# Patient Record
Sex: MALE | Race: WHITE | URBAN - METROPOLITAN AREA
[De-identification: names, ages, dates, MRNs, and addresses within clinical notes are randomized per-mention and may not be internally consistent; named-entity substitution may affect disease eponyms.]

---

## 2020-02-12 ENCOUNTER — TRANSFERRED RECORDS (OUTPATIENT)
Dept: HEALTH INFORMATION MANAGEMENT | Facility: CLINIC | Age: 70
End: 2020-02-12

## 2020-03-02 ENCOUNTER — TRANSFERRED RECORDS (OUTPATIENT)
Dept: HEALTH INFORMATION MANAGEMENT | Facility: CLINIC | Age: 70
End: 2020-03-02

## 2020-04-02 ENCOUNTER — TRANSFERRED RECORDS (OUTPATIENT)
Dept: HEALTH INFORMATION MANAGEMENT | Facility: CLINIC | Age: 70
End: 2020-04-02

## 2020-04-17 ENCOUNTER — TELEPHONE (OUTPATIENT)
Dept: ONCOLOGY | Facility: CLINIC | Age: 70
End: 2020-04-17

## 2020-04-17 ENCOUNTER — TRANSCRIBE ORDERS (OUTPATIENT)
Dept: OTHER | Age: 70
End: 2020-04-17

## 2020-04-17 DIAGNOSIS — C61 PROSTATE CANCER (H): Primary | ICD-10-CM

## 2020-04-17 NOTE — TELEPHONE ENCOUNTER
ONCOLOGY INTAKE: Records Information      APPT INFORMATION:  Referring provider: Susy Benjamin MD  Referring provider s clinic:  [facility]  Reason for visit/diagnosis:  Prostate Cancer  Has patient been notified of appointment date and time?: Yes    RECORDS INFORMATION:  Were the records received with the referral (via [employer])? Yes    Has patient been seen for any external appt for this diagnosis? No    If yes, where? N/a    Has patient had any imaging or procedures outside of [facility] for this condition? No      If Yes, where? N/a    ADDITIONAL INFORMATION:  None

## 2020-04-17 NOTE — TELEPHONE ENCOUNTER
RECORDS STATUS - ALL OTHER DIAGNOSIS      RECORDS RECEIVED FROM: [facility]   DATE RECEIVED: 4/21/2020    NOTES STATUS DETAILS   OFFICE NOTE from referring provider Complete  Refer by Dr. Susy Benjamin   OFFICE NOTE from medical oncologist Requesting  [facility]    DISCHARGE SUMMARY from hospital N/A    DISCHARGE REPORT from the ER     OPERATIVE REPORT     MEDICATION LIST Complete  [facility]   CLINICAL TRIAL TREATMENTS TO DATE     LABS     PATHOLOGY REPORTS Requested- [facility]   Tracking Number:    [id number]  Second fax request sent on 4/20/2020  Prostate Biopsy from 4/9/2020   Case: [id number]   ANYTHING RELATED TO DIAGNOSIs     GENONOMIC TESTING     TYPE:     IMAGING (NEED IMAGES & REPORT)     CT SCANS Complete- [facility]/ [facility] CT Abdomen 4/2/2020   Xray Bone Density Scan - whole body  Request- [facility]/ [facility]    Complete- [facility] 7/10/2012    4/2/2020   MRI     MAMMO     ULTRASOUND     PET       Action    Action Taken 4/17/2020 3:05pm     I called pt Gee - I left a vm requesting a call back.     He said all his imaging, pathology and records are at [facility]. I sent an SWATI to his email address: [email or web address]    I called [facility]   Ph: [phone]  Fax: [phone]  I spoke to a woman named Claire   [facility] closes at 4pm.     3:30pm   I called pt Juan dye and he signed the SWATI right away    3:47pm   I faxed the release and request form over to Claire at [facility]     Some IMG was done at [facility] (Push to PACS) [phone] #3- Karla is going to push at least three images to  PACS.     4/20/2020 8:24am   I faxed MR ([facility]) to [facility].     I called [facility] ph: [phone] - they recommended calling [facility] ph: [phone]- they will push the bone density scan from 2012.     8:34am   I faxed the MR ([facility]) to Dr. Plascencia too.     I called [facility] again     I called Karla at [facility] and she has no additional IMGs to request.    I called  jessica Martinez back- I told him that his records are in from [facility] and he confirmed that there aren't many images to request.      I sent another individual request for pathology slides to [facility].     4/22/2020 9:19am   Dr. Plascencia asked about the [facility] IMGs that were resolved in PACS last Friday. I also asked for a fax number to send the reports to.     9:38am   I called jessica Martinez-he confirmed again that there are no chest images to request.

## 2020-04-20 ENCOUNTER — TRANSFERRED RECORDS (OUTPATIENT)
Dept: HEALTH INFORMATION MANAGEMENT | Facility: CLINIC | Age: 70
End: 2020-04-20

## 2020-04-21 ENCOUNTER — PRE VISIT (OUTPATIENT)
Dept: ONCOLOGY | Facility: CLINIC | Age: 70
End: 2020-04-21

## 2020-04-21 ENCOUNTER — VIRTUAL VISIT (OUTPATIENT)
Dept: ONCOLOGY | Facility: CLINIC | Age: 70
End: 2020-04-21
Payer: COMMERCIAL

## 2020-04-21 VITALS — BODY MASS INDEX: 34.46 KG/M2 | WEIGHT: 260 LBS | HEIGHT: 73 IN

## 2020-04-21 DIAGNOSIS — C61 PROSTATE CANCER (H): Primary | ICD-10-CM

## 2020-04-21 PROCEDURE — 99203 OFFICE O/P NEW LOW 30 MIN: CPT | Mod: 95 | Performed by: INTERNAL MEDICINE

## 2020-04-21 RX ORDER — FLUTICASONE PROPIONATE 50 MCG
2 SPRAY, SUSPENSION (ML) NASAL PRN
COMMUNITY

## 2020-04-21 RX ORDER — METOPROLOL SUCCINATE 50 MG/1
50 TABLET, EXTENDED RELEASE ORAL DAILY
COMMUNITY
Start: 2020-03-30

## 2020-04-21 ASSESSMENT — PAIN SCALES - GENERAL: PAINLEVEL: MODERATE PAIN (5)

## 2020-04-21 ASSESSMENT — MIFFLIN-ST. JEOR: SCORE: 2008.23

## 2020-04-21 NOTE — NURSING NOTE
SYMPTOM ASSESSMENT    Pain: 5/10- knees (states he should have gotten knees replacements last Fall but didn't)    Nausea/Vomiting: no    Mouth Sores: no    Shortness of Breath: no    Smoking: no    Fever or Chills: no    Hard Stools: no    Soft Stools: no    Weight Loss: no    Weakness: no    Burning, numbness or tingling in hands or feet: yes    Problems with skin or swelling: no    Memory Loss: no    Anxiety or Depression: anxiety (COVID related)    Trouble Sleeping: no    Socorro Marquez LPN on 4/21/2020 at 1:24 PM

## 2020-04-21 NOTE — PATIENT INSTRUCTIONS
Please let us know once you have made any decision and we will then refer you to the appropriate specialty accordingly.

## 2020-04-21 NOTE — PROGRESS NOTES
"Gee Zarco is a 69 year old male who is being evaluated via a billable video visit.      The patient has been notified of following:     \"This video visit will be conducted via a call between you and your physician/provider. We have found that certain health care needs can be provided without the need for an in-person physical exam.  This service lets us provide the care you need with a video conversation.  If a prescription is necessary we can send it directly to your pharmacy.  If lab work is needed we can place an order for that and you can then stop by our lab to have the test done at a later time.    Video visits are billed at different rates depending on your insurance coverage.  Please reach out to your insurance provider with any questions.    If during the course of the call the physician/provider feels a video visit is not appropriate, you will not be charged for this service.\"    Patient has given verbal consent for Video visit? Yes    How would you like to obtain your AVS? MyChart    Patient would like the video invitation sent by: Send to e-mail at: [email or web address]    Will anyone else be joining your video visit? No    Video Start Time: Unable to connect to video so changed to phone visit. 1:49 PM      Video-Visit Details    Type of service:  Video Visit- changed to phone visit.    Video End Time (time video stopped): 2:17 PM    Originating Location (pt. Location): Home    Distant Location (provider location):  [facility]     Mode of Communication:  Video Conference via [employer]- changed to phone visit.      Janel Plascencia MD    Oncology initial visit:  Date on this visit: 4/21/2020    Gee Zarco  is referred by Dr.Mary ZO Benjamin for an oncology consultation. He requires evaluation for new diagnosis of prostate cancer.    Primary Physician: Susy Benjamin       Nursing Notes:   Jimmy April, LPN  4/21/2020  1:24 PM  Signed  SYMPTOM ASSESSMENT    Pain: 5/10- " knees (states he should have gotten knees replacements last Fall but didn't)    Nausea/Vomiting: no    Mouth Sores: no    Shortness of Breath: no    Smoking: no    Fever or Chills: no    Hard Stools: no    Soft Stools: no    Weight Loss: no    Weakness: no    Burning, numbness or tingling in hands or feet: yes    Problems with skin or swelling: no    Memory Loss: no    Anxiety or Depression: anxiety (COVID related)    Trouble Sleeping: no    Socorro Marquez LPN on 4/21/2020 at 1:24 PM    History Of Present Illness:  This is a phone visit.  Mr. Zarco is a 69 year old male who presents with new diagnosis of prostate cancer.    He had an elevated PSA level of 9.57 in Jan 2020 on a routine annual physical visit.  Denies any LUTS. No pain. Remains fully active and is fully active. Has arthritis in both knees which limits some of his activities. He tells me that he was offered knee replacement but he was not willing to get it done then.  He has mild tingling and burning in the toes for several years- no recent change. No recent issues with GI tract.    ECOG 0    ROS:  A comprehensive ROS was otherwise neg      Past Medical/Surgical History:  Past Medical History:   Diagnosis Date     AK (actinic keratosis) 7/24/2012     Allergies      Cataracts, both eyes 8/28/2012     Photosensitive contact dermatitis    Bilateral knee arthritis.  Cluster headaches.  Ulcerative colitis- diagnosed 10 years ago when he had diarrhea issues and colonoscopy was done- was on Mercaptopurine for 10 years- but he recently had a colonoscopy in Feb 2020, which showed no evidence of UC so Mercaptopurine was stopped.     No past surgical history on file.     Cancer History:   As above    Allergies:  Allergies as of 04/21/2020 - Reviewed 04/21/2020   Allergen Reaction Noted     Penicillin g  07/23/2012     Current Medications:  Current Outpatient Medications   Medication Sig Dispense Refill     atorvastatin (LIPITOR) 40 MG tablet Take 40 mg by mouth  daily.       fluticasone (FLONASE) 50 MCG/ACT nasal spray Spray 2 sprays in nostril as needed       levothyroxine (SYNTHROID, LEVOTHROID) 112 MCG tablet Take 224 tablets by mouth daily.       metoprolol succinate ER (TOPROL-XL) 50 MG 24 hr tablet Take 50 mg by mouth daily       mercaptopurine (PURINETHOL) 50 MG tablet Take 100 mg by mouth daily.        Family History:  Family History   Problem Relation Age of Onset     Cancer No family hx of      Diabetes No family hx of      Hypertension No family hx of      Cerebrovascular Disease No family hx of      Thyroid Disease No family hx of      Glaucoma No family hx of      Macular Degeneration No family hx of    Dad had lung cancer at 70.  Esophageal cancer in her paternal grand mother at 90.  He has 1 daughter who is healthy.    Social History:  Social History     Socioeconomic History     Marital status:      Spouse name: Not on file     Number of children: Not on file     Years of education: Not on file     Highest education level: Not on file   Occupational History     Not on file   Social Needs     Financial resource strain: Not on file     Food insecurity     Worry: Not on file     Inability: Not on file     Transportation needs     Medical: Not on file     Non-medical: Not on file   Tobacco Use     Smoking status: Former Smoker     Types: Cigarettes     Smokeless tobacco: Never Used     Tobacco comment: lives in smoke free household.   Substance and Sexual Activity     Alcohol use: Not on file     Drug use: Not on file     Sexual activity: Not on file   Lifestyle     Physical activity     Days per week: Not on file     Minutes per session: Not on file     Stress: Not on file   Relationships     Social connections     Talks on phone: Not on file     Gets together: Not on file     Attends Judaism service: Not on file     Active member of club or organization: Not on file     Attends meetings of clubs or organizations: Not on file     Relationship status:  "Not on file     Intimate partner violence     Fear of current or ex partner: Not on file     Emotionally abused: Not on file     Physically abused: Not on file     Forced sexual activity: Not on file   Other Topics Concern     Not on file   Social History Narrative     Not on file   quit smoking about 12 years ago. Drinks alcohol - beer/wine/pat daily.  Lives with wife. Now retired as a  for [employer].    Physical Exam:  Ht 1.854 m (6' 1\")   Wt 117.9 kg (260 lb)   BMI 34.30 kg/m         Constitutional.  Does not seem to be in any acute distress.  Respiratory.  Speaking in full sentences.  No coughing noted.  Neurological.  Alert and oriented x3.  Psychiatric.  Mood, mentation and affect are normal.  Decision making capacity is intact.      The rest of a comprehensive physical examination is deferred due to PHE (public health emergency) telephone visit restrictions.          Laboratory/Imaging Studies    Reviewed    ASSESSMENT/PLAN:    He has a High Risk Stage IIC ([id number]) Grade 4+4 and PSA 9.57- adenocarcinoma of the prostate gland.  I was not able to see the CT scan and the bone scan but as per his urologist's note, these were both negative.    We discussed the situation in detail.  Because of his localized disease, I believe that the treatment can be curative and we discussed options of radiation with androgen deprivation therapy for a couple of years versus possibility of surgery.  I do not recommend docetaxel because he does not have very high risk features.      I offered him referral to both radiation oncology as well as the urologist at the [facility] because he mentioned to me that he was not very satisfied with explanation which is previous urologist has given him but he wants to think about it and he will let me know if he wants me to refer him to radiation oncology and/or urology.    Further follow-up will be determined based upon what decision he will " make.    All of his questions were answered to his satisfaction.  He is agreeable and comfortable with the plan.    Janel Plascencia MD

## 2020-04-29 PROCEDURE — 00000346 ZZHCL STATISTIC REVIEW OUTSIDE SLIDES TC 88321: Performed by: INTERNAL MEDICINE

## 2020-04-30 LAB — COPATH REPORT: NORMAL

## 2020-05-07 ENCOUNTER — TRANSFERRED RECORDS (OUTPATIENT)
Dept: HEALTH INFORMATION MANAGEMENT | Facility: CLINIC | Age: 70
End: 2020-05-07

## 2020-12-06 ENCOUNTER — HEALTH MAINTENANCE LETTER (OUTPATIENT)
Age: 70
End: 2020-12-06

## 2021-09-26 ENCOUNTER — HEALTH MAINTENANCE LETTER (OUTPATIENT)
Age: 71
End: 2021-09-26

## 2022-01-16 ENCOUNTER — HEALTH MAINTENANCE LETTER (OUTPATIENT)
Age: 72
End: 2022-01-16

## 2023-04-23 ENCOUNTER — HEALTH MAINTENANCE LETTER (OUTPATIENT)
Age: 73
End: 2023-04-23